# Patient Record
(demographics unavailable — no encounter records)

---

## 2025-01-24 NOTE — HISTORY OF PRESENT ILLNESS
[FreeTextEntry1] : 38 year old  patient presents for annual exam.  Menses are regular w/o heavy bleeding  Menstrual triad [id number]  CC: No complaints  Chas Mino   with vasectomy

## 2025-01-24 NOTE — DISCUSSION/SUMMARY
[FreeTextEntry1] : Health Maintenance: -Pap with HPV neg 1/24, repeat next year.  -Mammo Rx ag 40 -TBSE -colonoscopy guidelines reviewed with patient. Start 45. Maternal uncle 65 with colon cancer.  -Achieve Vit D levels of 30-40, intake of 1100 mg daily calcium mostly thru dark green leafy greens and milk products, exercise 30 minutes TIW

## 2025-04-30 NOTE — PHYSICAL EXAM
[Normal] : uterus [No Bleeding] : there was no active vaginal bleeding [Uterine Adnexae] : were not tender and not enlarged [FreeTextEntry4] : no masses, growths nor fissures [FreeTextEntry5] : No retro uterine nodularity

## 2025-04-30 NOTE — PROCEDURE
[Amenorrhea] : Amenorrhea [Transvaginal Ultrasound] : transvaginal ultrasound [L: ___ cm] : L: [unfilled] cm [H: ___ cm] : H: [unfilled] cm [W: ___cm] : W: [unfilled] cm [FreeTextEntry3] : ET: 7.4mm   [FreeTextEntry7] : 3.0x1.7cm, ovary appears polycystic  [FreeTextEntry8] : 2.1x1.8x1.6cm ,ovary appears polycystic [FreeTextEntry4] : No free fluid

## 2025-04-30 NOTE — DISCUSSION/SUMMARY
[FreeTextEntry1] : Right breast lump: Noted @ 2oclock 1.3cm complicated cyst -BIRADS 3. She will f/u with breast specialist per PCP  Vaginal pain - TVUS unremarkable without free fluid nor adnexal mass -VE does not reproduce pain, no h/o endometriosis, dyspareunia nor recurrence -follow menstrual cycle and note day of pain if it recurs. -discussed urinary or gastrointestinal causes -call with pain to repeat US prn  Contracpetion: vasectomy  Jan '26 RTO

## 2025-04-30 NOTE — CHIEF COMPLAINT
[FreeTextEntry1] : 39 yo  female with an LMP of 2025 presents today for f/u s.p breast imaging as prescribed by PCP due to breast pain and palpable lump on right breast. She denies any skin changes, nipple changes, nipple discharge. She was noted to have Mammo- BIRADS 1 and Right breast US- BIRADS 3, noted to have right breast 1.3cm complicated cyst . She was advised to have breast specialist follow up by PCP, she will see breast specialist at [facility].  Additionally, c/o vaginal "shooting" pain while sitting on the toilet and turning to get toilet paper. She states pain resolved quickly. She denies any discharge, odor, urinary sxs.   SH: , with  vasectomy.    7.7x3.4  ET: 7.4mm  ROV 3.0x1.7 LOV 2.0